# Patient Record
Sex: MALE | Race: WHITE | URBAN - METROPOLITAN AREA
[De-identification: names, ages, dates, MRNs, and addresses within clinical notes are randomized per-mention and may not be internally consistent; named-entity substitution may affect disease eponyms.]

---

## 2017-08-10 ENCOUNTER — OFFICE VISIT - HEALTHEAST (OUTPATIENT)
Dept: FAMILY MEDICINE | Facility: CLINIC | Age: 27
End: 2017-08-10

## 2017-08-10 DIAGNOSIS — F17.290: ICD-10-CM

## 2017-08-10 DIAGNOSIS — Z00.00 ROUTINE GENERAL MEDICAL EXAMINATION AT A HEALTH CARE FACILITY: ICD-10-CM

## 2017-08-10 ASSESSMENT — MIFFLIN-ST. JEOR: SCORE: 1946.12

## 2017-09-15 ENCOUNTER — COMMUNICATION - HEALTHEAST (OUTPATIENT)
Dept: FAMILY MEDICINE | Facility: CLINIC | Age: 27
End: 2017-09-15

## 2018-04-12 ENCOUNTER — OFFICE VISIT - HEALTHEAST (OUTPATIENT)
Dept: FAMILY MEDICINE | Facility: CLINIC | Age: 28
End: 2018-04-12

## 2018-04-12 DIAGNOSIS — F41.0 PANIC ATTACKS: ICD-10-CM

## 2018-04-12 RX ORDER — PROPRANOLOL HYDROCHLORIDE 40 MG/1
40-60 TABLET ORAL PRN
Qty: 10 TABLET | Refills: 1 | Status: SHIPPED | OUTPATIENT
Start: 2018-04-12

## 2018-04-20 ENCOUNTER — OFFICE VISIT - HEALTHEAST (OUTPATIENT)
Dept: FAMILY MEDICINE | Facility: CLINIC | Age: 28
End: 2018-04-20

## 2018-04-20 DIAGNOSIS — F41.0 PANIC ATTACKS: ICD-10-CM

## 2018-04-20 DIAGNOSIS — L91.8 CUTANEOUS SKIN TAGS: ICD-10-CM

## 2018-06-28 ENCOUNTER — OFFICE VISIT - HEALTHEAST (OUTPATIENT)
Dept: FAMILY MEDICINE | Facility: CLINIC | Age: 28
End: 2018-06-28

## 2018-06-28 DIAGNOSIS — G47.00 INSOMNIA: ICD-10-CM

## 2018-06-28 ASSESSMENT — MIFFLIN-ST. JEOR: SCORE: 2041.38

## 2018-07-26 ENCOUNTER — COMMUNICATION - HEALTHEAST (OUTPATIENT)
Dept: FAMILY MEDICINE | Facility: CLINIC | Age: 28
End: 2018-07-26

## 2018-07-26 DIAGNOSIS — G47.00 INSOMNIA: ICD-10-CM

## 2018-10-12 ENCOUNTER — OFFICE VISIT - HEALTHEAST (OUTPATIENT)
Dept: FAMILY MEDICINE | Facility: CLINIC | Age: 28
End: 2018-10-12

## 2018-10-12 DIAGNOSIS — L91.8 SKIN TAG: ICD-10-CM

## 2019-04-16 ENCOUNTER — OFFICE VISIT - HEALTHEAST (OUTPATIENT)
Dept: FAMILY MEDICINE | Facility: CLINIC | Age: 29
End: 2019-04-16

## 2019-04-16 DIAGNOSIS — Z86.69 OTITIS MEDIA RESOLVED: ICD-10-CM

## 2019-08-12 ENCOUNTER — OFFICE VISIT - HEALTHEAST (OUTPATIENT)
Dept: FAMILY MEDICINE | Facility: CLINIC | Age: 29
End: 2019-08-12

## 2019-08-12 DIAGNOSIS — S29.011A PECTORALIS MUSCLE STRAIN, INITIAL ENCOUNTER: ICD-10-CM

## 2021-05-27 NOTE — PROGRESS NOTES
Assessment and Plan    1. Otitis media resolved  reassured  Can take a while for ullness feeling to go away- he can try Over-the-counter fluticasone for this    Return in about 4 months (around 8/16/2019) for Annual physical.    Emilee Javier MD     -------------------------------------------    Chief Complaint   Patient presents with     Follow-up     [facility], was on abx for 10 days. still feel like fluid in ears        [facility]. The first provider he saw thought he simply had allergies and advised appropriate Over-the-counter medications. HE went back because he didn't feel better, and the second provider diagnosed him with an ear infection.  He just finished 10 day roudn of augmentin.  HE still notes some mild pain and fullness in his ears and want to make sure this is gone    Other follow up   - skin tag I removed last visit healed nicely    Social: He is still working at [employer].  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  HE didn't make it this time, but is encouraged he got this far.        Current Outpatient Medications on File Prior to Visit   Medication Sig Dispense Refill     propranolol (INDERAL) 40 MG tablet Take 1-1.5 tablets (40-60 mg total) by mouth as needed (for anticipated anxiety). 10 tablet 1     [DISCONTINUED] ramelteon (ROZEREM) 8 mg tablet Take 1 tablet (8 mg total) by mouth at bedtime as needed for sleep. 30 tablet 1     No current facility-administered medications on file prior to visit.        The history section was last reviewed by Bijan Gunn CMA on Apr 16, 2019.    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     EXAM:    General  appearance - alert, well appearing, and in no distress  Ears - bilateral TM's and external ear canals normal

## 2021-05-31 VITALS — WEIGHT: 218 LBS | HEIGHT: 70 IN | BODY MASS INDEX: 31.21 KG/M2

## 2021-05-31 NOTE — PATIENT INSTRUCTIONS - HE
1. Try taking 2 tabs of Ibuprofen 2 times per day for 1 week, then as needed  2. Try to rest the area-- no heavy lifting for at least 1 week  3. Can try heat or ice pack (whichever feels better) 3 times per day  4. Can try Bengay cream or Tiger Balm cream (over the counter)

## 2021-05-31 NOTE — PROGRESS NOTES
Assessment & Plan  1. Pectoralis muscle strain, initial encounter  I do not think it is a fracture given that he has no bone pain and no preceding history of trauma.  I do not think it is a pneumothorax because he has normal breath sounds.  I think it is likely a muscular strain secondary to heavy weight lifting.  I advised that he rest for at least 1 to 2 weeks.  He should try scheduled ibuprofen 400 mg 3 times a day for the next week.  He should try icing and topical cream such as BenGay.  If no improvement would consider referral to physical therapy      Spring Gutierrez MD    Subjective  Chief Complaint:  Pain (right sided collar bone pain, x2weeks )    HPI:   Georges Lee is a 29 y.o. male who presents for right-sided pain.  He reports that he has pain over his right chest wall and clavicle.  The pain is worse with twisting and with other certain movements.  He has had no history of trauma.  He does a lot of heavy weight lifting and training.    He has no pain at rest  His pain is 7 out of 10 with certain movements  He has no shortness of breath  No chest pain with exertion    Has pain with certain movements.   No recent traumas    Allergies:  is allergic to codeine.    SH/FH:  Social History and Family History reviewed and updated.   Tobacco Status:  He  reports that he has quit smoking. He uses smokeless tobacco.    Review of Systems:    Constitutional:  No Fever  Cardiovascular: No Chest Pain, No SOB  Respiratory: No Cough, No Dyspnea   Musculoskeletal:right sided chest pain, no other muscle pain or ache  Skin: No Skin Lesions    Objective  Vitals:    08/12/19 1522   BP: 116/60   Pulse: 71   SpO2: 97%   Weight: (!) 232 lb 4 oz (105.3 kg)       Physical Exam:  GENERAL: Alert, well-appearing, in no acute distress.   CV: Regular rate and rhythm without murmurs, rubs or gallops. No peripheral edema  RESP: No increased work of breathing.  Lung sounds bilateral, clear, symmetric excursion.   MSK: No deformity.   No pain on palpation of the clavicle.  No pain on palpation of the ribs of the affected area.  Normal range of motion of the shoulder, normal strength of the arm, normal sensation of the arm and hand  NEURO: Alert and oriented. Normal motor and sensory

## 2021-06-01 VITALS — WEIGHT: 233 LBS | BODY MASS INDEX: 33.67 KG/M2

## 2021-06-01 VITALS — BODY MASS INDEX: 34.22 KG/M2 | WEIGHT: 239 LBS | HEIGHT: 70 IN

## 2021-06-01 VITALS — BODY MASS INDEX: 33.82 KG/M2 | WEIGHT: 234 LBS

## 2021-06-02 VITALS — WEIGHT: 236.5 LBS | BODY MASS INDEX: 34.18 KG/M2

## 2021-06-03 VITALS — BODY MASS INDEX: 33.56 KG/M2 | WEIGHT: 232.25 LBS

## 2021-06-03 VITALS — WEIGHT: 236 LBS | BODY MASS INDEX: 34.11 KG/M2

## 2021-06-12 NOTE — PROGRESS NOTES
1. Routine general medical examination at a health care facility    2. Dependence on nicotine from other tobacco product  - nicotine (NICODERM CQ) 14 mg/24 hr; Place 1 patch on the skin daily.  Dispense: 30 patch; Refill: 2  - nicotine polacrilex (NICORETTE) 2 mg gum; Apply 1 each (2 mg total) to the mouth or throat as needed for smoking cessation.  Dispense: 50 each; Refill: 1    counseled the patient for tobacco cessation and prescribed the patient a tobacco cessation medication  Counseling:  immunizations  Exercise  Preventive maintenance  Testicular self exam  MyChart - contact me if further refills needed for nicotine patch or gum    Emilee Javier MD    ------------------        Do you have any concerns today?    Habits:  Exercise: goes to gym 5-6 day a week and walks everywhere  Diet: regular - track caloris - uses my fitness pal  Do you take any herbs or supplements that were not prescribed by a doctor? yes: MV. [probiotic  Are you taking calcium supplements? no  Dairy  Are you taking aspirin daily? no  Do you wear seat belts? YES  Do you bike or ride a motorcycle? Do you wear a helmet? 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 yes  Does your partner need to use family planning? 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      Preventive  BP Readings from Last 3 Encounters:   08/10/17 108/60           Review of Systems  Do you have pain that bothers you in your daily life? no    Constitutional: negative for weight change or recent illness  Eyes: negative for change in vision  Ears, nose, mouth, throat, and face: negative for sore throat and nasal drainage  Respiratory: negative for cough or dyspnea  Cardiovascular: negative for chest pain and  palpitations  Gastrointestinal: negative for abdominal pain and change in bowel habits  Genitourinary:negative for dysuria  Breast: negative for lumps or pains  Integument: no rashes or lesions  Musculoskeletal:negative for arthralgias and myalgias  Neurological: negative for dizziness, headaches and paresthesia  Behavioral/Psych: negative for depression       Objective:     Vitals:    08/10/17 0746   BP: 108/60   Pulse: 80   Resp: 16   SpO2: 98%     Body mass index is 31.5 kg/(m^2).  Physical Exam:  General Appearance: Alert, cooperative, no distress, appears stated age  Head: Normocephalic, without obvious abnormality, atraumatic  Eyes: PERRL, conjunctiva/corneas clear, EOM's intact, fundi clear  Ears: Normal TM's and external ear canals, both ears  Nose: Nares normal, septum midline,mucosa normal, no drainage  Throat: Lips, mucosa, and tongue normal; teeth and gums normal  Neck: Supple, symmetrical, trachea midline, no adenopathy;  thyroid: not enlarged, symmetric, no tenderness/mass/nodules; no carotid bruit or JVD  Back: Symmetric, no curvature, ROM normal  Lungs: Clear to auscultation bilaterally, respirations unlabored  Heart: Regular rate and rhythm, S1 and S2 normal, no murmur, rub, or gallop,  Abdomen: Soft, non-tender, bowel sounds active all four quadrants,  no masses, no organomegaly  Musculoskeletal: Normal range of motion. No joint swelling or deformity.   Extremities: Extremities normal, atraumatic, no cyanosis or edema  Skin: no rashes or worrisome lesions - small skin tag on left upper back snipped (no anesthesia) - applied silver nitrate stick and compression dressin  Lymph nodes: Cervical, supraclavicular nodes normal  Neurologic: He is alert. He has normal reflexes.   Psychiatric: He has a normal mood and affect.

## 2021-06-16 PROBLEM — F41.0 PANIC ATTACKS: Status: ACTIVE | Noted: 2018-04-12

## 2021-06-17 NOTE — PROGRESS NOTES
"Assessment and Plan    1. Panic attacks  Georges is interested in a beta blocker, rather than an SSRI.  I did discuss that beta blockers are used more frequently when a source of panic (as in giving a presentation) is known, rather than for panic attacks that come on without warning.  He thinks he can tell chava he is going to get one and wants to try:  - propranolol (INDERAL) 40 MG tablet; Take 1-1.5 tablets (40-60 mg total) by mouth as needed (for anticipated anxiety).  Dispense: 10 tablet; Refill: 1    He will let me know how this goes - I can continue the RX if it works for him    Return if symptoms worsen or fail to improve.     Over 15 minutes spent with this patient, over half in counseling        Emilee Javier MD     -------------------------------------------    Chief Complaint   Patient presents with     Consult     DISCUSS ABOUT BETA-BLOCKERS.  SOMETIMES PT CAN'T BREATH, UNCOMFORTABLE.       I last saw Georges in August of last year for a physical exam. I has prescribed nicorette and Nicoderm  - he was able to get Nicoderm through [facility].  HE does not smoke cigarettes any longer but does use e-cig.  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     He is here today to discuss anxiety that he sometimes gets in \"massive doses.\" Heart beats rapidly and uncomfortably  - happens a couple of times a week.  A friend who is a nurse suggested he ask about beta blockers, as he does not want any addictive medications.   He has no caffeine in his diet, he meditates and gets decent sleep. 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       Patient Active Problem List   Diagnosis     Panic attacks       Current Outpatient Prescriptions on File Prior to Visit   Medication Sig Dispense Refill     nicotine polacrilex (NICORETTE) 2 mg gum Apply 1 each (2 mg total) to the mouth or throat as needed for smoking cessation. 50 each 1     No current facility-administered medications on file prior to visit.  "           History   Smoking Status     Former Smoker   Smokeless Tobacco     Current User       History   Alcohol Use No     Comment: sober for 10 months         Vitals:    04/12/18 1608   BP: 116/66   Pulse: 84   SpO2: 98%     Body mass index is 33.67 kg/(m^2).     EXAM:    General appearance - alert, well appearing, and in no distress  Mental status - normal mood, behavior, speech, dress, motor activity, and thought processes

## 2021-06-17 NOTE — PROGRESS NOTES
Assessment and Plan    1. Panic attacks  Controlled with propranolol - I can give him a 6 month refill when he runs out of current RX    2. Cutaneous skin tags  Snipped 2 on right upper lateral chest        Return in about 6 months (around 10/20/2018).    Emilee Javier MD     -------------------------------------------    Chief Complaint   Patient presents with     Follow-up     Beta blocker and skin tag removal.      Georges comes for a follow up of his anxiety.  At his request, I started him on propranolol prn for anxiety attacks.  He says this is working well.  He is not feeling light-headed or dizzy when he takes the propranolol - about 3x/week    Also he hs two skin tags that he would like removed.  I had done this previously for him and things had healed nicely  Other concerns:    Patient Active Problem List   Diagnosis     Panic attacks         Current Outpatient Prescriptions:      nicotine (NICODERM CQ) 21 mg/24 hr, Place 1 patch on the skin daily., Disp: , Rfl:      propranolol (INDERAL) 40 MG tablet, Take 1-1.5 tablets (40-60 mg total) by mouth as needed (for anticipated anxiety)., Disp: 10 tablet, Rfl: 1     nicotine polacrilex (NICORETTE) 2 mg gum, Apply 1 each (2 mg total) to the mouth or throat as needed for smoking cessation., Disp: 50 each, Rfl: 1        History   Smoking Status     Former Smoker   Smokeless Tobacco     Current User       History   Alcohol Use No     Comment: sober for 10 months         Vitals:    04/20/18 1425   BP: 114/58   Pulse: (!) 56   Resp: 16     Body mass index is 33.82 kg/(m^2).     Pulse Readings from Last 3 Encounters:   04/20/18 (!) 56   04/12/18 84   08/10/17 80         EXAM:    General appearance - alert, well appearing, and in no distress  Heart - S1 and S2 normal, normal rhythm, no murmurs, asymptomatic bradycardia  Skin - two flesh colored skin tags wit 1-2 mm base at upper left lateral pectoral area    Minor procedure: after obtaining verbal consent, I swabbed skin  tags with alcohol.  With each one, I grasped the tab wit sterile forceps, and snipped with sterile small scissor.  The topmost tag required a little application of silver nitrate sticks, and the one below had minimal bleeding.  Applied small band-aid to both.  He tolerated the procedure just fine.

## 2021-06-19 NOTE — PROGRESS NOTES
"Assessment and Plan    1. Insomnia  His sleep habits are good , but still has trouble sleeping and would like non-addictive medication  - nortriptyline (PAMELOR) 10 MG capsule; Take 1 capsule (10 mg total) by mouth at bedtime.  Dispense: 30 capsule; Refill: 2     Patient Instructions   Le's try things in this order    1) nortriptyline - a \"tricyclic antidepressant\" - used in smaller doses helps with sleep    2) rozerem - melatonin agonist    3) mirtazapine - another antidepressant - not an SSRI or SNRI - adrenaline agnoist      follow up if not improving after above  Emilee Javier MD     -------------------------------------------    Chief Complaint   Patient presents with     Sleeping Problem     pt would like to discuss medications      Georges would like to try a non-addictive medication to address his ongoing insomnia. He has been having trouble sleeping for years. He is now almost 2 years sober - wondered if this was drug and alcohol abuse.  Gets up at  5 am, eats healthy food, active at work, works out, no caffeine.  Gets in bed at 9am -he is  Conor if he is asleep at 11:30. Has tried seroquel and trazodone - both not helpful. Over-the-counter medication makes him feel drowsy.  Works out right after work - not just before bed. 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 - beard for two years and long hair for 2 years   - performance anxiety : propranolol still helping - hasn't taken it in weeks, and he does not need a refill at present      Other concerns:    Patient Active Problem List   Diagnosis     Panic attacks         Current Outpatient Prescriptions:      nicotine (NICODERM CQ) 21 mg/24 hr, Place 1 patch on the skin daily., Disp: , Rfl:      nicotine polacrilex (NICORETTE) 2 mg gum, Apply 1 each (2 mg total) to the mouth or throat as needed for smoking cessation., Disp: 50 each, " Rfl: 1     nortriptyline (PAMELOR) 10 MG capsule, Take 1 capsule (10 mg total) by mouth at bedtime., Disp: 30 capsule, Rfl: 2     propranolol (INDERAL) 40 MG tablet, Take 1-1.5 tablets (40-60 mg total) by mouth as needed (for anticipated anxiety)., Disp: 10 tablet, Rfl: 1          History   Smoking Status     Former Smoker   Smokeless Tobacco     Current User       History   Alcohol Use No     Comment: sober for 10 months       Review of Systems - feeling well overall    Vitals:    06/28/18 1412   BP: 114/62   Pulse: 71   SpO2: 99%     Body mass index is 34.54 kg/(m^2).     EXAM:    General appearance - alert, well appearing, and in no distress - quite a different look for him without the hair  Mental status - normal mood, behavior, speech, dress, motor activity, and thought processes

## 2021-06-21 NOTE — PROGRESS NOTES
Assessment and Plan    1. Skin tag  Snipped  easily        Return in about 6 months (around 4/12/2019).    Emilee Javier MD     -------------------------------------------    Chief Complaint   Patient presents with     Procedure     pt requesting skin tag removal on right pectoral area and check testosterone level      I did explain to Georges that there was no reason to check testosterone and that there might be a cost if there were no medical reason. He said he was just curious, but it is not important.    He would like this one skin tag removed; I have removed several others in the past and he has healed well and appreciated this      Other concerns:    Patient Active Problem List   Diagnosis     Panic attacks         Current Outpatient Prescriptions:      nicotine (NICODERM CQ) 21 mg/24 hr, Place 1 patch on the skin daily., Disp: , Rfl:      nicotine polacrilex (NICORETTE) 2 mg gum, Apply 1 each (2 mg total) to the mouth or throat as needed for smoking cessation., Disp: 50 each, Rfl: 1     nortriptyline (PAMELOR) 10 MG capsule, Take 1 capsule (10 mg total) by mouth at bedtime., Disp: 30 capsule, Rfl: 2     propranolol (INDERAL) 40 MG tablet, Take 1-1.5 tablets (40-60 mg total) by mouth as needed (for anticipated anxiety)., Disp: 10 tablet, Rfl: 1     ramelteon (ROZEREM) 8 mg tablet, Take 1 tablet (8 mg total) by mouth at bedtime as needed for sleep., Disp: 30 tablet, Rfl: 1        History   Smoking Status     Former Smoker   Smokeless Tobacco     Current User       History   Alcohol Use No     Comment: sober for 10 months       Review of Systems - he is feeling well and anxiety is controlled    Vitals:    10/12/18 1531   BP: 124/64   Pulse: 89   Resp: 16   SpO2: 98%     Body mass index is 34.18 kg/(m^2).     EXAM:    General appearance - alert, well appearing, and in no distress  Skin - He has a pigmented skin tag on right upper pectoral area with a narrow stalk on which there is no pigment.  With his permission  performed the following minor procedure I grasped this with forceps, snipped with small curved scissors, applied pressure and silver nitrate stick, then antibiotic ointment and band aid.  He tolerated this will